# Patient Record
(demographics unavailable — no encounter records)

---

## 2025-01-02 NOTE — DISCUSSION/SUMMARY
[FreeTextEntry1] : The patient has a history of a dilated aortic root and bradycardia.  He is physically active without difficulty.  EKG shows atrial sensing and ventricular pacing.  His pacemaker is functioning normally.  He will return for an echocardiogram to evaluate his aortic root.  He will also have an abdominal sonogram to check him for an abdominal aneurysm. [EKG obtained to assist in diagnosis and management of assessed problem(s)] : EKG obtained to assist in diagnosis and management of assessed problem(s)

## 2025-01-02 NOTE — HISTORY OF PRESENT ILLNESS
[FreeTextEntry1] : The patient is semiretired.  He cycles and lifts weights.  He has not had chest discomfort.

## 2025-01-02 NOTE — PHYSICAL EXAM
[Well Developed] : well developed [Well Nourished] : well nourished [No Acute Distress] : no acute distress [Normal Conjunctiva] : normal conjunctiva [Normal Venous Pressure] : normal venous pressure [No Carotid Bruit] : no carotid bruit [Normal S1, S2] : normal S1, S2 [No Rub] : no rub [No Gallop] : no gallop [Clear Lung Fields] : clear lung fields [Good Air Entry] : good air entry [No Respiratory Distress] : no respiratory distress  [Soft] : abdomen soft [Non Tender] : non-tender [No Masses/organomegaly] : no masses/organomegaly [Normal Bowel Sounds] : normal bowel sounds [Normal Gait] : normal gait [No Edema] : no edema [No Cyanosis] : no cyanosis [No Clubbing] : no clubbing [No Varicosities] : no varicosities [No Rash] : no rash [No Skin Lesions] : no skin lesions [Moves all extremities] : moves all extremities [No Focal Deficits] : no focal deficits [Normal Speech] : normal speech [Alert and Oriented] : alert and oriented [Normal memory] : normal memory [de-identified] : 1/6 systolic ejection murmur

## 2025-07-10 NOTE — DISCUSSION/SUMMARY
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  EKG today: 60 bpm V paced  Elevated BP - Goal < 130/80 - Advised reducing sodium intake - Continue with increased activities - He currently defers at home BP checks  AV block SP PPM - Remains asymptomatic - EKG today unremarkable - Continue with serial PPM checks   Thoracic Aortic Ectasia :  - Stable, asymptomatic.  - Echocardiogram done today, results pending - Abd US negative for aneurysm in 2021.  - Continue with serial monitoring.   CVD Risk:  - Active without chest pains or dyspnea.  - Last CA score zero ( 2024 ) - Significant CAD not likely but continue with risk factor optimization - Obtain fasting labs today, will calculate ASCVD score  6 months  I have discussed the case with REMA Horan. I have personally performed a history, physical exam, and my own medical decision making. I have reviewed the note and agree with the findings and plan.   The patient has been off his diet.  EKG shows sinus rhythm with atrial sensing and ventricular pacing.  Echocardiogram reveals mildly reduced ejection fraction with an ascending aorta of 4.1 cm.  Patient's ejection fraction is unchanged.  Ascending aorta is mildly enlarged.  The patient declines cholesterol medication.  He will attempt to improve his diet.  He is at risk for an abdominal aneurysm.  He will return for an abdominal sonogram.

## 2025-07-10 NOTE — HISTORY OF PRESENT ILLNESS
[FreeTextEntry1] : 67 year old male former smoker ( 2.5 packs per week x 25 years, quit 2020 ) with PMHX of high degree AV block sp PPM (12/2020 ), MVP and thoracic aortic ectasia here for follow up and echo.  He is overall doing well.   He is now retired. He is not as active but he remains cycling on the weekends up to 20-30 miles. He has no chest pains.   Patient also denies any palpitations, lightheadedness, orthopnea, PND, dizziness, falls, syncope or neuro focal deficits.  Slightly elevated BP noted. He does admit to some snacking in the evening including popcorns, chips.   Fasting labs sent today ==================== Previous Work Up Labs (6/29/2023):  TG 63 HDL 98 LDL 72 Cr 1.05 H/H 14.9/47.2  PPM Check 6/2025  CA Score ( 07/30/2024 ) Zero 1.  The calcium score is minimal at 0 Agatston units 2.  Absence of aortic calcification. 3.  Pacemaker leads noted in the right heart. 4.  Ascending aorta measures 43 x 41 mm at the level of pulmonary artery bifurcation.  Echo (6/29/2023): 1. Normal left ventricular cavity size. The left ventricular wall thickness is normal. Abnormal septal motion consistent with right ventricular pacemaker. The left ventricular systolic function is normal with an ejection fraction of 58 % by Simms's method of disks. 2. There is normal left ventricular diastolic function. 3. Normal right ventricular cavity size, normal right ventricular wall thickness and normal right ventricular systolic function. 4. Borderline dilated sinus of Valsalva, measuring 3.90 cm (indexed 1.84 cm/m2). 5. Mild left ventricular hypertrophy. 6. Mild mitral regurgitation. 7. No pericardial effusion seen. 8. Prolapse of the anterior mitral leaflet.  AAA Scan ( 08/30/2021 ) negative without aneurysm

## 2025-07-10 NOTE — PHYSICAL EXAM
[Well Developed] : well developed [Well Nourished] : well nourished [No Acute Distress] : no acute distress [No Carotid Bruit] : no carotid bruit [Normal S1, S2] : normal S1, S2 [Clear Lung Fields] : clear lung fields [Good Air Entry] : good air entry [No Respiratory Distress] : no respiratory distress  [Normal Gait] : normal gait [No Edema] : no edema [Moves all extremities] : moves all extremities [No Focal Deficits] : no focal deficits [Normal Speech] : normal speech [Alert and Oriented] : alert and oriented [Normal memory] : normal memory [de-identified] : 2/6 systolic ejection murmur